# Patient Record
Sex: MALE | Race: ASIAN | Employment: OTHER | URBAN - METROPOLITAN AREA
[De-identification: names, ages, dates, MRNs, and addresses within clinical notes are randomized per-mention and may not be internally consistent; named-entity substitution may affect disease eponyms.]

---

## 2017-01-06 ENCOUNTER — HOSPITAL ENCOUNTER (OUTPATIENT)
Dept: NUTRITION | Age: 67
Discharge: HOME | End: 2017-01-06
Payer: MEDICARE

## 2017-01-06 PROCEDURE — 97802 MEDICAL NUTRITION INDIV IN: CPT

## 2017-01-06 NOTE — PROGRESS NOTES
Aleksandra Mobley [facility] Services  [address], [address], [place], [address] [facility]  Phone: [phone]  Fax: [phone]   Nutrition Assessment  Medical Nutrition Therapy   Outpatient Initial Evaluation         Patient Name: Madi Suero : 1950   Treatment Diagnosis: Diabetes Type 2 Onset Date:    Referral Source: Jana Sanchez MD Start of Care [facility]): 2017     Ht:  65 in  cm Wt: 217 lb  kg   BMI: 36.1  BMR   Male 12 BMR female      PMHx includes: High cholesterol, COPD (current smoker, has tried to quit previously), gout, ASHLYN, RCC s/p open radical nephrectomy (2015), GERD, Espinosa's esophagus     Medications of Nutritional Significance:   Advair, albuterol, allopurinol     Subjective/Assessment:   64YO male referred to RD for diabetes type 2. Pt reports both his parents have diabetes and he was just diagnosed with diabetes. His most recent A1c was 6.8% on 16 (eAG- 149). Per BMI, pt is considered obese class 2. HDL cholesterol at goal, but could benefit from being higher. Remaining cholesterol above goals: chol- [id number], LDL- [id number], Trig- [id number] (on 16). Pt is retired and lives with wife; pt usually does all the grocery shopping and cooking. Pt does not exercise at all right now and he has noticed his weight increasing. He also admits to eating out frequently and consuming larger portions, contributing to weight gain. Pt expressed comprehension and some motivation; expected compliance is fair as pt seemed slightly resistant to change. Current Eating Patterns: Slightly difficult to obtain accurate diet recall as pt was somewhat tangential during interview. Appears pt's meals can be imbalanced, mostly carbs (cinnamon apple oatmeal packet with butter) or only protein (2 eggs with coffee) for first meal. Some meals are excessive in calories and unhealthy fats, such as 6 pieces of chicken with skin (legs, thighs and wings) with rice and vegetables.  He usually eats 3-5 meals per day. He drinks soda occasionally, but no sweet tea or juice. Handouts/  Information Provided: [x]  Carbohydrates  [x]  Protein  []  Fiber  [x]  Serving Sizes  []  Meal and Snack Ideas  []  Food Journals [x]  Diabetes  []  Cholesterol  []  Sodium  []  Gen Nutr Guidelines  []  SBGM Guidelines  []  Others:   Educated pt on the rationale for dietary modifications for better blood glucose control. Kept education simple, as pt seemed to get off topic at times. Educated pt on carbohydrate food sources and appropriate meal timing. Discussed the Healthy Plate Method and appropriate portion sizes of different foods groups, especially portion for carbohydrates. Explained the importance of combining carbohydrates with protein at meals and reviewed foods that contain each nutrient.    Estimate Needs:   Calories: 1700 Protein: 106 Carbs: 191 Fat: 57   Kcal/day  g/day  g/day  g/day        percent: 25  45  30     Nutritional Goal - To promote lifestyle changes to result in:    [x]  Weight loss  [x]  Improved diabetic control  []  Decreased cholesterol levels  []  Decreased blood pressure  []  Weight maintenance []  Preventing any interactions associated with food allergies  []  Adequate weight gain toward goal weight  []  Other:        Recommendations: - Pt will combine carbohydrates with a protein source at every meal and snack.  - Pt will limit carbohydrate amount to 1/4 of plate at each meal.     Information Reviewed with: pt   Potential Barriers to Learning: none     Dietitian Signature: Tiarra Pate RD Date: 1/6/2017   Follow-up: Fri, 4/14/17 @1pm Time: 3:24 PM

## 2017-04-14 ENCOUNTER — HOSPITAL ENCOUNTER (OUTPATIENT)
Dept: NUTRITION | Age: 67
Discharge: HOME | End: 2017-04-14
Payer: MEDICARE

## 2017-04-14 PROCEDURE — 97803 MED NUTRITION INDIV SUBSEQ: CPT

## 2017-04-14 NOTE — PROGRESS NOTES
NUTRITION  DAILY TREATMENT NOTE  Patient Name: Mariam Jain         Date: 2017  : 1950    YES Patient  Verified  Insurance: Payor: Aurea Postin / Plan: [place] MED[place] PART A & B / Product Type: Medicare /    Diagnosis: Type 2 Diabetes      In time:   1pm             Out time:   1:30pm   Total Treatment Time (min):   30     SUBJECTIVE    Medication Changes/New allergies or changes in medical history, any new surgeries or procedures? NO    If yes, update Summary List   Subjective Functional Status/Changes:  []  No changes reported   Pt is slowly progressing and most of his progress within the past few months was mentally deciding that he wants to make changes to his diet to improve his health. He was able to lose a few pounds despite reporting that he tends to over eat at several meals. For example, pt reports he consumed 3 sandwiches (chicken or tuna salad with ortega, onion, celery) on 3 buns, then had figs. Other meal lack balance and enough calories, such as just 2 hard boiled eggs with coffee. He reports drinking approx. 1-1.5 liters of water per day, 1-2 cups of coffee and a soda occasionally. He is currently not exercising, but reports he can start walking. He has been lacking the motivation. He does not check his BG (no glucometer), therefore unable to assess progress towards better BG control.    Current Wt: 212.4# Previous Wt: 217# Wt Change: -4.6     OBJECTIVE    Patient Education:  [x]  Review current plan with patient   []  Other:    Handouts/  Information Provided: []  Carbohydrates  []  Protein  []  Fiber  []  Serving Sizes  []  Fluids  []  General guidelines []  Diabetes  []  Cholesterol  []  Sodium  []  SBGM  []  Food Journals  []  Others:    Estimated Needs: [phone]    Calories Protein CHO Fat     ASSESSMENT    []  Pt Progressing Well [x]  Slow Progress []  No Progress    Other:    Understand Dietary       Changes/Recommendations []  No Change [x]  Improving []  N/A   Weight []  No Change [x]  Improving []  N/A   Glucose Levels []  No Change []  Improving []  N/A   Cholesterol Levels []  No Change []  Improving []  N/A     RECOMMENDATIONS    - Pt will combine carbohydrates with a protein source at every meal and snack.  - Pt will follow the healthy plate method to ensure meals are balanced and to keep carbohydrate intake limit (to 1/4 of plate) consistent throughout the day. - Pt will drink calorie free or very low calorie/carb beverages only. - Pt will start walking at least 3 days per week for 10 minutes and increase as tolerated.      PLAN    [x]  Continue on current plan []  Follow-up PRN   []  Discharge due to :    [x]  Next Appt[de-identified] Fri, 7/7/17 @1pm     Dietitian: Kendal Bynum RD    Date: 4/14/2017 Time: 1:48 PM

## 2017-05-15 PROBLEM — J42 CHRONIC BRONCHITIS (HCC): Status: ACTIVE | Noted: 2017-05-15

## 2017-07-07 ENCOUNTER — HOSPITAL ENCOUNTER (OUTPATIENT)
Dept: NUTRITION | Age: 67
Discharge: HOME | End: 2017-07-07
Payer: MEDICARE

## 2017-07-07 PROCEDURE — 97803 MED NUTRITION INDIV SUBSEQ: CPT

## 2017-07-07 NOTE — PROGRESS NOTES
NUTRITION  DAILY TREATMENT NOTE  Patient Name: Ignacio Spencer         Date: 2017  : 1950    YES Patient  Verified  Insurance: Payor: Kathy Chapman / Plan: [place] MEDICARE PART A & B / Product Type: Medicare /    Diagnosis:     Diabetes  In time:   1pm             Out time:   1:30pm   Total Treatment Time (min):   30     SUBJECTIVE    Medication Changes/New allergies or changes in medical history, any new surgeries or procedures? NO    If yes, update Summary List   Subjective Functional Status/Changes:  []  No changes reported   Pt admits that he eats whatever he wants, whenever he wants and believes he is too old to change his ways. States that all the men in his family have  earlier. Pt states that he is not worried about managing his weight for the remainder of his life and just wants to enjoy it. Seems pt is not really ready to change. He did not consistently walk for exercise since his last visit, but states that he gets physical activity by gardening, yard work and playing with his grand kids. He attempted to keep track of a few of his meals before wanting to quit. Appears pt consumes large portions of meat, 3.5 oz up to 2 lbs at a time. He loves marbled meats. He also states he has been consuming a lot of fruit too. Current Wt: 208.2# Previous Wt: 212.4# Wt Change: -4.2     OBJECTIVE    Patient Education:  [x]  Review current plan with patient   []  Other: Used motivational interviewing based on his reluctance to change.    Handouts/  Information Provided: []  Carbohydrates  []  Protein  []  Fiber  []  Serving Sizes  []  Fluids  []  General guidelines []  Diabetes  []  Cholesterol  []  Sodium  []  SBGM  []  Food Journals  []  Others:    Estimated Needs: [phone]    Calories Protein CHO Fat     ASSESSMENT    []  Pt Progressing Well [x]  Slow Progress []  No Progress    Other:    Understand Dietary       Changes/Recommendations [x]  No Change []  Improving []  N/A   Weight []  No Change [x]  Improving []  N/A   Glucose Levels []  No Change []  Improving []  N/A   Cholesterol Levels []  No Change []  Improving []  N/A     RECOMMENDATIONS    - Pt will combine carbohydrates with a protein source at every meal and snack.  - Pt will follow the healthy plate method to ensure meals are balanced and to keep carbohydrate intake limit (to 1/4 of plate) consistent throughout the day. - Pt will drink calorie free or very low calorie/carb beverages only. - Pt will start walking at least 3 days per week for 10 minutes and increase as tolerated.      PLAN    [x]  Continue on current plan []  Follow-up PRN   []  Discharge due to :    [x]  Next Appt[de-identified] Fri, 9/29 @12:30pm; Fri, 12/8 @12:30pm     Dietitian: Peter Yeager RD    Date: 7/7/2017 Time: 1:25 PM

## 2017-09-29 ENCOUNTER — HOSPITAL ENCOUNTER (OUTPATIENT)
Dept: NUTRITION | Age: 67
Discharge: HOME | End: 2017-09-29
Payer: MEDICARE

## 2017-09-29 ENCOUNTER — HOSPITAL ENCOUNTER (OUTPATIENT)
Dept: NUTRITION | Age: 67
End: 2017-09-29

## 2017-09-29 PROCEDURE — 97803 MED NUTRITION INDIV SUBSEQ: CPT

## 2017-09-29 NOTE — PROGRESS NOTES
NUTRITION  FOLLOW-UP TREATMENT NOTE  Patient Name: Ankit Holm         Date: 2017  : 1950    YES Patient  Verified  Diagnosis: diabetes   In time:   12pm             Out time:   12:30pm   Total Treatment Time (min):   30     SUBJECTIVE/ASSESSMENT  Changes in medication or medical history? Any new allergies, surgeries or procedures? NO    If yes, update Summary List   Again, pt is noncompliant and he knows it. He also doesn't really want to change his eating habits. He continues to over eat, such as 5 Hawaiian rolls with pulled pork in addition to other items on his plate. He admits to eating too much and that he is not paying attention to his diet. He has been walking every day for 10 minutes though. Pt admits to feeling depressed about his age, decreased physical abilities and family relationships, which is likely contributing to his poor eating habits. Current Wt: 213# Previous Wt: 208.2# Wt Change: +4.8     Achievement of Goals: - Pt will combine carbohydrates with a protein source at every meal and snack.   - Pt will follow the healthy plate method to ensure meals are balanced and to keep carbohydrate intake limit (to 1/4 of plate) consistent throughout the day. - Pt will drink calorie free or very low calorie/carb beverages only. - Pt will start walking at least 3 days per week for 10 minutes and increase as tolerated. = met, continue     Patient Education:  []  Review current plan with patient   []  Other: Praised pt for the healthy things he is doing such as walking.  Encouraged pt to address depression with a mental health professional.   Handouts/  Information Provided: []  Carbohydrates  []  Protein  []  Fiber  []  Serving Sizes  []  Fluids  []  General guidelines []  Diabetes  []  Cholesterol  []  Sodium  []  SBGM  []  Food Journals  []  Others:      New Patient Goals: none     PLAN  [x]  Continue on current plan [x]  Follow-up PRN   []  Discharge due to :    []  Next appt: Dietitian: Katherine Rendon RD    Date: 9/29/2017 Time: 12:36 PM

## 2017-12-08 ENCOUNTER — APPOINTMENT (OUTPATIENT)
Dept: NUTRITION | Age: 67
End: 2017-12-08

## 2018-05-16 PROBLEM — E66.01 SEVERE OBESITY (BMI 35.0-39.9) WITH COMORBIDITY (HCC): Status: ACTIVE | Noted: 2018-05-16

## 2019-08-01 ENCOUNTER — HOSPITAL ENCOUNTER (OUTPATIENT)
Dept: NUTRITION | Age: 69
Discharge: HOME | End: 2019-08-01
Payer: MEDICARE

## 2019-08-01 PROCEDURE — 97802 MEDICAL NUTRITION INDIV IN: CPT

## 2019-08-01 NOTE — PROGRESS NOTES
Aleksandra Mobley [facility] Services  [address], [address], [facility], [address]  Phone: [phone] Fax: [phone]   Nutrition Assessment  Medical Nutrition Therapy   Outpatient Initial Evaluation         Patient Name: Jhonny Beach : 1950   Treatment Diagnosis: DM2   Referral Source: Francois Oliver * Start of Care [facility]): 2019     Gender: male Age: 77 y.o. Ht: 64 in Wt: 210.2  lb     BMI: 36.1 BMR   Male 36 BMR Female    Anthropometrics Assessment: obesity     Past Medical History includes: DM2, s/p nephrectomy due to renal cancer, high cholesterol     Pertinent Medications:   zocor     Biochemical Data:   No results found for: HBA1C, HGBE8, XLJ0TWMJ, BJP6ZSEG  No results found for: CHOL, CHOLPOCT, CHOLX, CHLST, CHOLV, HDL, HDLPOC, LDL, LDLCPOC, LDLC, DLDLP, VLDLC, VLDL, TGLX, TRIGL, TRIGP, TGLPOCT, CHHD, CHHDX  No results found for: GPT, ALT, SGOT, GGT, GGTP, AP, APIT, APX, CBIL, TBIL, TBILI  No results found for: MICAELA, CREAPOC, ACREA, CREA, REFC3, REFC4  No results found for: BUN, BUNPOC, IBUN, MBUNV, BUNV  No results found for: MCACR, MCA1, MCA2, MCA3, MCAU, MCAU2, MCALPOCT     Subjective/Assessment:   Patient seen today in nutrition for initial assessment for diabetes and weight management. Patient has seen a dietitian previously for diabetes education. Patient would like to lose weight to around 185 pounds. He reports A1C of 6.4 not on medication. He does not check his blood sugar currently. Nutrition Diagnosis: Obesity related to increased PO intake and lack of activity as evidenced by high BMI and unhealthy diet and exercise recall. Current Eating Patterns: Diet recall:  Breakfast: toast, butter, avocado, and 2 eggs with 2 cups coffee and chocolate milk  Snack: toast with butter and egg  Lunch: sandwich with protein  Snack: ham and cheese sandwich  Dinner: beans, chicken, TV dinner, fruit, homemade sausage, pasta with meat and pasta. Water and ginger ale  Snack: ice cream, cookies, pie  Mid night snack: TV dinner    No food allergies  Fluids: water, ginger ale and coffee. No alcohol  Eating out 1-2 times per month  Exercise: none  No supplements       Estimate Needs   Calories:  1600 Protein: 75 Carbs: <135     Kcal/day  g/day  g/day      Education provided:   Intervention:  Education, Comprehensive - reviewed prescription below. Nutrition education provided on medical nutrition therapy for type 2 Diabetes Mellitus and weight loss. Educated patient on the rational for dietary modifications to include more fiber, protein and modified carbohydrate consumption. Provided education on food label reading, food groups (carbohydrates, proteins and non-starchy vegetables), and meal planning for diabetes control. Individual carbohydrate goals were assessed and provided to the patient. We discussed the importance of having a protein source with each meal and snack to help with blood sugar control and hunger. Meal and snack times were discussed. Reviewed sample meal plan, tips for eating out and grocery shopping. The patient was provided with my contact info. Patient asked questions and indicated understanding.        Handouts Provided: [x]  Carbohydrates  [x]  Protein  [x]  Fiber  [x]  Serving Sizes  [x]  Meal Ideas  [x]  Non-starchy Vegetables []  Diabetes  []  Cholesterol  []  Snacks  [x]  Food labels  []  Others:   Information Reviewed with: Patient   Readiness to Change Stage: []  Pre-contemplative    [x]  Contemplative  []  Preparation               []  Action                  []  Maintenance   Potential Barriers to Learning: []  Decline in memory    []  Language barrier   []  Other:  []  Emotional                  []  Limited mobility  []  Lack of motivation     [] Vision, hearing or cognitive impairment   Expected Compliance: Good      Nutritional Goal - To promote lifestyle changes to result in:    [x]  Weight loss  [x]  Improved diabetic control  [x]  Decreased cholesterol levels  []  Decreased blood pressure  []  Weight maintenance []  Preventing any interactions associated with food allergies  []  Adequate weight gain toward goal weight  []  Other:        Nutrition Prescription/  Patient Goals:    Limit intake of carbohydrates to no more than.   o <30 grams of carbohydrate per meal.  o <15 grams of carbohydrate per snack     Have 3 meals per day and 1-2 snacks (as needed).  Aim for at least 3 ounces (deck of cards) of a protein choice at every meal and 1-2 ounces at snack times.  Choose one or more vegetables to include with lunch, dinner and snacks (as needed).  Have at least 64 ounces of fluids per day. Choose sugar-free beverages only.  Keep Food Log daily     Decrease portion of butter     Goal is to decrease weight by 4 pounds in 1 month.        Dietitian Signature: Tellis Carrel, MS, RD Date: 8/1/2019   Follow-up: October 24th at 3:00 at [facility] Time: 3:49 PM

## 2019-10-24 ENCOUNTER — HOSPITAL ENCOUNTER (OUTPATIENT)
Dept: NUTRITION | Age: 69
Discharge: HOME | End: 2019-10-24
Payer: MEDICARE

## 2019-10-24 PROCEDURE — 97803 MED NUTRITION INDIV SUBSEQ: CPT

## 2019-10-24 NOTE — PROGRESS NOTES
NUTRITION  FOLLOW-UP TREATMENT NOTE  Patient Name: Howard Bosworth         Date: 10/24/2019  : 1950    YES/NO Patient  Verified  Diagnosis: DM2, Obesity   In time:   3:00             Out time:   3:30   Total Treatment Time (min):   30 minutes     SUBJECTIVE/ASSESSMENT    Changes in medication or medical history? Any new allergies, surgeries or procedures? YES/NO    If yes, update Summary List   Patient seen today in nutrition for follow up weight management. He is down 7 pounds since our last visit by making small changes. Examples) decreased portions of carbs, butter, fried food. Changing from butter to olive oil. Increasing vegetables. Diet recall:  Breakfast: 2 eggs and 1-2 slice toast with butter, cheese and avocado or oatmeal  Snack: fruit  Lunch: 1 cup rice with beans or stirfry with beef  Snack: fruit  Dinner: 1 cup rice with beans or chicken  Snack: Peanut butter and Sugar free jelly sandwich    Fluids: 2 liter water and regular ginger ale    Reports started walking this week 1 day for 30+ minutes. Patient reports new diagnosis of fatty liver. Today we discussed that his current nutrition recommendations will help to improve his lab work for fatty liver with weight loss and carbohydrate moderation. Current Wt: 203# Previous Wt: 210.2 Wt Change: -7.2#     Initial Wt: 210.2 Total Wt change: -7.2 Height: 64     Nutrition Diagnosis        Diagnosis Status:     Nutrition Diagnosis: Obesity related to increased PO intake and lack of activity as evidenced by high BMI and unhealthy diet and exercise recall      [x]  Improved []  No Change    []  Declined        Achievement of Goals: · Limit intake of carbohydrates to no more than. Improved - continue  ? <30 grams of carbohydrate per meal.  ? <15 grams of carbohydrate per snack     · Have 3 meals per day and 1-2 snacks (as needed).  Met - continue     · Aim for at least 3 ounces (deck of cards) of a protein choice at every meal and 1-2 ounces at snack times. Improved - continue     · Choose one or more vegetables to include with lunch, dinner and snacks (as needed). Improved - continue     · Have at least 64 ounces of fluids per day. Choose sugar-free beverages only. Improved - continue     · Keep Food Log daily - not completed     · Decrease portion of butter  - met - continue     · Goal is to decrease weight by 4 pounds in 1 month.  - almost met - continue        Patient Education:  [x]  Review current plan with patient   []  Other:    Handouts/  Information Provided: []  Carbohydrates  []  Protein  []  Fiber  []  Serving Sizes  []  Fluids  []  Snacks []  Diabetes  []  Cholesterol  []  Sodium  []  SBGM  []  Non starchy vegetables  []  Others:      New Patient Goals: · Limit to only 1 slice bread at snacks  · Continue to decrease rice portion  · Walk 30 minutes 3.5 days per week       PLAN    [x]  Continue on current plan []  Follow-up PRN   []  Discharge due to :    [x]  Next appt: April 2nd at 2:00 at [address] [place] [address] Street: [address], RD    Date: 10/24/2019 Time: 2:48 PM

## 2020-07-30 ENCOUNTER — APPOINTMENT (OUTPATIENT)
Dept: NUTRITION | Age: 70
End: 2020-07-30

## 2021-06-02 ENCOUNTER — HOSPITAL ENCOUNTER (OUTPATIENT)
Dept: PHYSICAL THERAPY | Age: 71
Discharge: HOME | End: 2021-06-02
Payer: MEDICARE

## 2021-06-02 PROCEDURE — 97162 PT EVAL MOD COMPLEX 30 MIN: CPT

## 2021-06-02 PROCEDURE — 97530 THERAPEUTIC ACTIVITIES: CPT

## 2021-06-02 NOTE — PROGRESS NOTES
PHYSICAL THERAPY - DAILY TREATMENT NOTE    Patient Name: Pili Mccoy        Date: 2021  : 1950   YES Patient  Verified  Visit #:     Insurance: Payor: Nika Racer / Plan: [place] MEDICARE PART A & B / Product Type: Medicare /      In time: 11:10 A Out time: 12:00 P   Total Treatment Time: 40     BCBS/Medicare Time Tracking (below)   Total Timed Codes (min):  40 1:1 Treatment Time:  40     TREATMENT AREA =  Pain in left knee [M25.562]  SUBJECTIVE  Pain Level (on 0 to 10 scale):  5  / 10   Medication Changes/New allergies or changes in medical history, any new surgeries or procedures?     NO    If yes, update Summary List   Subjective Functional Status/Changes:  []  No changes reported     See POC           Modalities Rationale:   Patient deferred   min [] Estim, type/location:                                      []  att     []  unatt     []  w/US     []  w/ice    []  w/heat    min []  Mechanical Traction: type/lbs                   []  pro   []  sup   []  int   []  cont    []  before manual    []  after manual    min []  Ultrasound, settings/location:      min []  Iontophoresis w/ dexamethasone, location:                                               []  take home patch       []  in clinic    min []  Ice     []  Heat    location/position:     min []  Vasopneumatic Device, press/temp:     min []  Other:    [] Skin assessment post-treatment (if applicable):    []  intact    []  redness- no adverse reaction     []redness  adverse reaction:          10 min Therapeutic Activity: [x]  See flow sheet   Rationale:    increase ROM, increase strength, improve coordination, improve balance and increase proprioception to improve the patients ability to return to pain-free bed mobility      Billed With/As:   [] TE   [] TA   [] Neuro   [] Self Care Patient Education: [x] Review HEP    [] Progressed/Changed HEP based on:   [] positioning   [] body mechanics   [] transfers   [] heat/ice application    [] other: Other Objective/Functional Measures:    See POC    Reviewed positioning at night in R S/L with 1-2 pillows as well as bed mobility/rolling & sit to stand transfers to decrease c/o R knee pain       Post Treatment Pain Level (on 0 to 10) scale:   5  / 10     ASSESSMENT  Assessment/Changes in Function:     See POC     []  See Progress Note/Recertification   Patient will continue to benefit from skilled PT services to modify and progress therapeutic interventions, address functional mobility deficits, address ROM deficits, address strength deficits, analyze and address soft tissue restrictions, analyze and cue movement patterns, analyze and modify body mechanics/ergonomics, assess and modify postural abnormalities, address imbalance/dizziness and instruct in home and community integration to attain remaining goals.    Progress toward goals / Updated goals:    Progressing towards goals established at [address] [facility] [address]  []  Upgrade activities as tolerated YES Continue plan of care   []  Discharge due to :    []  Other:      Therapist: Ruy Delatorre PT    Date: 6/2/2021 Time: 12:17 PM     Future Appointments   Date Time Provider Adriana Aguila   6/8/2021  2:45 PM Bernardine Rayray, PT [facility]   6/10/2021  3:00 PM Bernardine Rayray, PT [facility]   6/15/2021  2:45 PM Bernardine Rayray, PT [facility]   6/17/2021  2:45 PM Bernardine Rayray, PT [facility]   6/22/2021  2:45 PM Bernardine Rayray, PT [facility]   6/24/2021  3:00 PM Bernardine Rayray, PT [facility]   6/29/2021  2:45 PM Bernardine Rayray, PT [facility]   7/1/2021  2:45 PM Samir Jha, PT [facility]

## 2021-06-02 NOTE — PROGRESS NOTES
[address] [facility] PHYSICAL THERAPY AT [address] [place] [address]  [facility]ohan Reagan [facility] 07, [address] ,[address], [address]  Phone: [phone]  Fax: [phone] / STATEMENT OF MEDICAL NECESSITY FOR PHYSICAL THERAPY SERVICES  Patient Name: Harshal Paul : 1950   Medical   Diagnosis: Pain in left knee [M25.562] Treatment Diagnosis: L knee pain   Onset Date: chronic     Referral Source: Amrita Comer MD Start of Care [facility]): 2021   Prior Hospitalization: See medical history Provider #: [id number]   Prior Level of Function: Manageable symptoms with ADLs   Comorbidities: H/o L TKR 2015,    Medications: Verified on Patient Summary List   The Plan of Care and following information is based on the information from the initial evaluation.   ==================================================================================  Assessment / key information:  Patient is a pleasant 65 y.o. male who presents to [facility] with L knee pain. Current medical hx includes current h/o renal CA & s/p R nephrectomy in . Most recent scan revealed progressive widespread osseous sclerotic metastasis. Patient reports initial onset of sx \"years ago\" which were of unknown etiology, previous h/o L TKR in . Current c/o pain are intermittent in nature & worsen with activities such as performing sit to stand transfers after periods of prolonged sitting & with changing positions when sleeping at night, he prefers to sleep in R S/L position. Average reported pain level at 5/10, 6-7/10 at worst & 0/10 at best.  Upon objective evaluation, patient demonstrates L knee AROM as follows 0-120 degrees as compared to 0-13. Degrees on R. MMT revealed  Overall L knee strength at 5-/5. Patient ambulates without AD, with minimal knee flexion on L.   Patient can benefit PT interventions to improve ROM, decrease pain & improve gait mechanics to facilitate return to unlimited ADLs, work activities & overall functional status.   ==================================================================================  Eval Complexity: History HIGH Complexity :3+ comorbidities / personal factors will impact the outcome/ POC ;  Examination  MEDIUM Complexity : 3 Standardized tests and measures addressing body structure, function, activity limitation and / or participation in recreation ; Presentation MEDIUM Complexity : Evolving with changing characteristics ; Decision Making MEDIUM Complexity : FOTO score of 26-74; Overall Complexity MEDIUM  Problem List: pain affecting function, decrease ROM, decrease strength, edema affecting function, impaired gait/ balance, decrease ADL/ functional abilitiies, decrease activity tolerance, decrease flexibility/ joint mobility and decrease transfer abilities   Treatment Plan may include any combination of the following: Therapeutic exercise, Therapeutic activities, Neuromuscular re-education, Physical agent/modality, Gait/balance training, Manual therapy, Aquatic therapy, Patient education, Self Care training, Functional mobility training, Home safety training and Stair training  Patient / Family readiness to learn indicated by: asking questions, trying to perform skills and interest  Persons(s) to be included in education: patient (P)  Barriers to Learning/Limitations: None  Measures taken:    Patient Goal (s): Less pain   Patient self reported health status: fair  Rehabilitation Potential: fair   Short Term Goals: To be accomplished in  2  weeks:  1) Establish HEP to prevent further disability. 2) Patient will report decreased c/o pain to < or = 3-4/10 to facilitate sleeping at night uninterrupted with manageable sx in L knee. 3) Improve FOTO score from 40 points to > or = 50 points indicating improved tolerance with ADLs in regards to L knee.   4) Patient will be able to maintain L SLS for 30 seconds indicating improved use of  balance strategy for safety with ambulation in challenging environments.  Long Term Goals: To be accomplished in  4  weeks:  1) Improve FOTO score from 50 points to > or = 61 points indicating improved tolerance with ADLs in regards to L knee. 2) Patient to report 50% improvement in overall function in preparation for return to recreational activities with manageable sx in L knee. 3) Patient will ambulate with a normalized gait pattern on L for limited community ambulation with no signs of antalgia. 4) Patient to be independent & compliant with HEP in preparation for D/C. Frequency / Duration:   Patient to be seen  2-3  times per week for 4  weeks:  Patient / Caregiver education and instruction: self care, activity modification, brace/ splint application and exercises    Therapist Signature: OPAL Jacobs, miguelangel MDT Date: 2/0/6934   Certification Period: 6-02-21 to 8-31-21 Time: 11:22 AM   =================================================================================  I certify that the above Physical Therapy Services are being furnished while the patient is under my care. I agree with the treatment plan and certify that this therapy is necessary.     Physician Signature:                                                             Date:                                     Time:                                                                       Olvin Foley MD

## 2021-06-08 ENCOUNTER — APPOINTMENT (OUTPATIENT)
Dept: PHYSICAL THERAPY | Age: 71
End: 2021-06-08
Payer: MEDICARE

## 2021-06-10 ENCOUNTER — APPOINTMENT (OUTPATIENT)
Dept: PHYSICAL THERAPY | Age: 71
End: 2021-06-10
Payer: MEDICARE

## 2021-06-15 ENCOUNTER — HOSPITAL ENCOUNTER (OUTPATIENT)
Dept: PHYSICAL THERAPY | Age: 71
Discharge: HOME | End: 2021-06-15
Payer: MEDICARE

## 2021-06-15 PROCEDURE — 97530 THERAPEUTIC ACTIVITIES: CPT

## 2021-06-15 PROCEDURE — 97110 THERAPEUTIC EXERCISES: CPT

## 2021-06-15 NOTE — PROGRESS NOTES
PHYSICAL THERAPY - DAILY TREATMENT NOTE    Patient Name: Elisha Topete        Date: 6/15/2021  : 1950   YES Patient  Verified  Visit #:     Insurance: Payor: Israel Malone / Plan: [place] MEDICARE PART A & B / Product Type: Medicare /      In time: 2:45 P Out time: 3:30 P   Total Treatment Time: 40     BCBS/Medicare Time Tracking (below)   Total Timed Codes (min):  40 1:1 Treatment Time:  40     TREATMENT AREA =  Pain in left knee [M25.562]  SUBJECTIVE  Pain Level (on 0 to 10 scale):  3  / 10   Medication Changes/New allergies or changes in medical history, any new surgeries or procedures? NO    If yes, update Summary List   Subjective Functional Status/Changes:  []  No changes reported     Patient reports he has most pain when he sits too long & goes to get up from the chair.                min [] Estim, type/location:                                      []  att     []  unatt     []  w/US     []  w/ice    []  w/heat    min []  Mechanical Traction: type/lbs                   []  pro   []  sup   []  int   []  cont    []  before manual    []  after manual    min []  Ultrasound, settings/location:      min []  Iontophoresis w/ dexamethasone, location:                                               []  take home patch       []  in clinic    min []  Ice     []  Heat    location/position:     min []  Vasopneumatic Device, press/temp:    If using vaso (only need to measure limb vaso being performed on)      pre-treatment girth :       post-treatment girth :       measured at (landmark location) :      min []  Other:    [] Skin assessment post-treatment (if applicable):    []  intact    []  redness- no adverse reaction                  []redness  adverse reaction:        25 min Therapeutic Exercise:  [x]  See flow sheet   Rationale:      increase ROM and increase strength to improve the patients ability to return to pain-free ambulation     15 min Therapeutic Activity: [x]  See flow sheet   Rationale: increase ROM and increase strength to improve the patients ability to return to pain-free sit to stand transfers     Billed With/As:   [] TE   [] TA   [] Neuro   [] Self Care Patient Education: [x] Review HEP    [] Progressed/Changed HEP based on:   [] positioning   [] body mechanics   [] transfers   [] heat/ice application    [] other:      Other Objective/Functional Measures:    Initiated exercises per flow sheet (see updated HEP)  TA: transfer training from sit to stand as well as stair training using nonrec pattern & 1 HR for support to prevent worsening of pain     Post Treatment Pain Level (on 0 to 10) scale:   2  / 10     ASSESSMENT  Assessment/Changes in Function:     Good tolerance to initial program, no increase in pain, steady improvements in passive extension     []  See Progress Note/Recertification   Patient will continue to benefit from skilled PT services to modify and progress therapeutic interventions, address functional mobility deficits, address ROM deficits, address strength deficits, analyze and cue movement patterns, analyze and modify body mechanics/ergonomics, assess and modify postural abnormalities, address imbalance/dizziness and instruct in home and community integration to attain remaining goals.    Progress toward goals / Updated goals:    Progressing towards STG 2 & STG 1 met      PLAN  []  Upgrade activities as tolerated YES Continue plan of care   []  Discharge due to :    []  Other:      Therapist: Sumit Ghosh PT    Date: 6/15/2021 Time: 3:34 PM     Future Appointments   Date Time Provider Adriana Aguila   6/17/2021  2:45 PM Jorge A Gipson, PT [facility]   6/22/2021  2:45 PM Jorge A Gipson PT [facility]   6/24/2021  3:00 PM Jorge Alesley Gipson PT [facility]   6/29/2021  2:45 PM Jorge A Gipson PT [facility]   7/1/2021  2:45 PM Vamshi Jha, PT [facility]

## 2021-06-17 ENCOUNTER — HOSPITAL ENCOUNTER (OUTPATIENT)
Dept: PHYSICAL THERAPY | Age: 71
Discharge: HOME | End: 2021-06-17
Payer: MEDICARE

## 2021-06-17 PROCEDURE — 97530 THERAPEUTIC ACTIVITIES: CPT

## 2021-06-17 PROCEDURE — 97110 THERAPEUTIC EXERCISES: CPT

## 2021-06-17 NOTE — PROGRESS NOTES
PHYSICAL THERAPY - DAILY TREATMENT NOTE    Patient Name: Michael Mistry        Date: 2021  : 1950   YES Patient  Verified  Visit #:   3   of   12  Insurance: Payor: Anette Garcia / Plan: [place] MEDICARE PART A & B / Product Type: Medicare /      In time: 2:30 P Out time: 3:20 P   Total Treatment Time: 45     BCBS/Medicare Time Tracking (below)   Total Timed Codes (min):  45 1:1 Treatment Time:  45     TREATMENT AREA =  Pain in left knee [M25.562]  SUBJECTIVE  Pain Level (on 0 to 10 scale):  3  / 10   Medication Changes/New allergies or changes in medical history, any new surgeries or procedures? NO    If yes, update Summary List   Subjective Functional Status/Changes:  []  No changes reported     Patient reports he has not done his HEP, he had chemotherapy yesterday, he has these treatments on .              min [] Estim, type/location:                                      []  att     []  unatt     []  w/US     []  w/ice    []  w/heat    min []  Mechanical Traction: type/lbs                   []  pro   []  sup   []  int   []  cont    []  before manual    []  after manual    min []  Ultrasound, settings/location:      min []  Iontophoresis w/ dexamethasone, location:                                               []  take home patch       []  in clinic    min []  Ice     []  Heat    location/position:     min []  Vasopneumatic Device, press/temp:    If using vaso (only need to measure limb vaso being performed on)      pre-treatment girth :       post-treatment girth :       measured at (landmark location) :      min []  Other:    [] Skin assessment post-treatment (if applicable):    []  intact    []  redness- no adverse reaction                  []redness  adverse reaction:        30 min Therapeutic Exercise:  [x]  See flow sheet   Rationale:      increase ROM and increase strength to improve the patients ability to return to pain-free ambulation     15 min Therapeutic Activity: [x]  See flow sheet   Rationale:    increase ROM and increase strength to improve the patients ability to return to pain-free sit to stair negotiation     Billed With/As:   [] TE   [] TA   [] Neuro   [] Self Care Patient Education: [x] Review HEP    [] Progressed/Changed HEP based on:   [] positioning   [] body mechanics   [] transfers   [] heat/ice application    [] other:      Other Objective/Functional Measures: Added step downs (lateral & forward step down off 4 inch step), and sit to stand transfers     Post Treatment Pain Level (on 0 to 10) scale:   2 / 10     ASSESSMENT  Assessment/Changes in Function:     C/o pain with forward step down today off 4 inch step, improved tolerance to lateral step down     []  See Progress Note/Recertification   Patient will continue to benefit from skilled PT services to modify and progress therapeutic interventions, address functional mobility deficits, address ROM deficits, address strength deficits, analyze and cue movement patterns, analyze and modify body mechanics/ergonomics, assess and modify postural abnormalities, address imbalance/dizziness and instruct in home and community integration to attain remaining goals.    Progress toward goals / Updated goals:    Progressing towards STG 2      PLAN  []  Upgrade activities as tolerated YES Continue plan of care   []  Discharge due to :    []  Other:      Therapist: Max Michael PT    Date: 6/17/2021 Time: 3:20 PM     Future Appointments   Date Time Provider Adriana Aguila   6/21/2021 12:30 PM Ramez Sellers, PT [facility]   6/24/2021  3:00 PM Ramez Sellers PT [facility]   6/29/2021  2:45 PM Ramez Sellers PT [facility]   7/1/2021  2:45 PM Teddy Jha, PT [facility]

## 2021-06-21 ENCOUNTER — HOSPITAL ENCOUNTER (OUTPATIENT)
Dept: PHYSICAL THERAPY | Age: 71
Discharge: HOME | End: 2021-06-21
Payer: MEDICARE

## 2021-06-21 PROCEDURE — 97110 THERAPEUTIC EXERCISES: CPT

## 2021-06-21 PROCEDURE — 97530 THERAPEUTIC ACTIVITIES: CPT

## 2021-06-21 NOTE — PROGRESS NOTES
PHYSICAL THERAPY - DAILY TREATMENT NOTE    Patient Name: Jason Nguyễn        Date: 2021  : 1950   YES Patient  Verified  Visit #:     Insurance: Payor: Divina Velasco / Plan: [place] MEDICARE PART A & B / Product Type: Medicare /      In time: 12:25 P Out time: 1:20 P   Total Treatment Time: 50     BCBS/Medicare Time Tracking (below)   Total Timed Codes (min):  50 1:1 Treatment Time:  50     TREATMENT AREA =  Pain in left knee [M25.562]  SUBJECTIVE  Pain Level (on 0 to 10 scale):  2  / 10   Medication Changes/New allergies or changes in medical history, any new surgeries or procedures? NO    If yes, update Summary List   Subjective Functional Status/Changes:  []  No changes reported     Patient would like to hold off on PT until he has f/u with oncologist to talk about results of most recent PET scan on 21.            Modalities Rationale:     Patient deferred   min [] Estim, type/location:                                      []  att     []  unatt     []  w/US     []  w/ice    []  w/heat    min []  Mechanical Traction: type/lbs                   []  pro   []  sup   []  int   []  cont    []  before manual    []  after manual    min []  Ultrasound, settings/location:      min []  Iontophoresis w/ dexamethasone, location:                                               []  take home patch       []  in clinic    min []  Ice     []  Heat    location/position:     min []  Vasopneumatic Device, press/temp:    If using vaso (only need to measure limb vaso being performed on)      pre-treatment girth :       post-treatment girth :       measured at (landmark location) :      min []  Other:    [] Skin assessment post-treatment (if applicable):    []  intact    []  redness- no adverse reaction                  []redness  adverse reaction:        40 min Therapeutic Exercise:  [x]  See flow sheet   Rationale:      increase ROM, increase strength and improve balance to improve the patients ability to return to pain-free ADLs      10 min Therapeutic Activity: [x]  See flow sheet   Rationale:    increase ROM, increase strength, improve balance and increase proprioception to improve the patients ability to return to pain-free sit to stand transfers     Billed With/As:   [] TE   [] TA   [] Neuro   [] Self Care Patient Education: [x] Review HEP    [] Progressed/Changed HEP based on:   [] positioning   [] body mechanics   [] transfers   [] heat/ice application    [] other:      Other Objective/Functional Measures:    See flow sheet (see updated HEP)     Post Treatment Pain Level (on 0 to 10) scale:   2  / 10     ASSESSMENT  Assessment/Changes in Function:     Patient in agreement with possible DC to home program until f/u with MD     []  See Progress Note/Recertification   Patient will continue to benefit from skilled PT services to instruct in home and community integration to attain remaining goals.    Progress toward goals / Updated goals:    STG 1, 2 met      PLAN  []  Upgrade activities as tolerated NO Continue plan of care   []  Discharge due to :    [x]  Other: Patient on hold from PT      Therapist: Bessy Deutsch PT    Date: 6/21/2021 Time: 1:37 PM     Future Appointments   Date Time Provider Adriana Aguila   6/24/2021  3:00 PM Belinda Moreira [facility]   6/29/2021  2:45 PM Cecilio Kehr, PT [facility]   7/1/2021  2:45 PM Cyndi Jha, PT [facility]

## 2021-06-22 ENCOUNTER — APPOINTMENT (OUTPATIENT)
Dept: PHYSICAL THERAPY | Age: 71
End: 2021-06-22
Payer: MEDICARE

## 2021-06-24 ENCOUNTER — APPOINTMENT (OUTPATIENT)
Dept: PHYSICAL THERAPY | Age: 71
End: 2021-06-24
Payer: MEDICARE

## 2021-06-29 ENCOUNTER — APPOINTMENT (OUTPATIENT)
Dept: PHYSICAL THERAPY | Age: 71
End: 2021-06-29
Payer: MEDICARE

## 2021-07-01 ENCOUNTER — APPOINTMENT (OUTPATIENT)
Dept: PHYSICAL THERAPY | Age: 71
End: 2021-07-01

## 2021-09-20 NOTE — PROGRESS NOTES
[address] [facility] PHYSICAL THERAPY  [facility]  [facility]ohan Reagan [facility] [address], [address] ,[address], [address]  Phone: [phone]  Fax: [phone] FOR PHYSICAL THERAPY          Patient Name: Jason Nguyễn : 1950   Treatment/Medical Diagnosis: Pain in left knee [M25.562]   Onset Date: chronic    Referral Source: Adriano Real MD [facility] [place]): 21   Prior Hospitalization: See Medical History Provider #: [id number]   Prior Level of Function: Manageable symptoms with ADLs   Comorbidities: H/o L TKR    Medications: Verified on Patient Summary List   Visits from [facility]: 4 Missed Visits: 0     Goal/Measure of Progress Goal Met? 1.  Establish HEP to prevent further disability. Status at last Eval: NA Current Status: HEP established  yes   2. Patient will report decreased c/o pain to < or = 3-4/10 to facilitate sleeping at night uninterrupted with manageable sx in L knee. Status at last Eval: 5/10 average Current Status: Reported pain level at 2-3/10 at final PT Rx yes   3. Improve FOTO score from 40 points to > or = 50 points indicating improved tolerance with ADLs in regards to L knee. Status at last Eval: 40 Current Status: Unable to be re-assessed n/a   4. Patient will be able to maintain L SLS for 30 seconds indicating improved use of  balance strategy for safety with ambulation in challenging environments. Status at last Eval: unable Current Status: Unable to be re-assessed n/a     Key Functional Changes/Progress: Mr. Cosme Smart was last seen for PT on 21, he had been seen for 3 f/u treatments. Good tolerance to initial program, although c/o pain as well as instability with step downs. Patient requested to hold off of on PT until results of most recent PET scan on . No further contact has been made with clinic to continue with PT, therefore patient to be discharged to home program.      Assessments/Recommendations:  Other: DC to home program, self DC    If you have any questions/comments please contact us directly at [phone]. Thank you for allowing us to assist in the care of your patient.     Therapist Signature: OPAL Guerrero, cert MDT Date: 8-67-30   Reporting Period: 6-02-21 to 6-21-21 Time: 9:10 AM